# Patient Record
(demographics unavailable — no encounter records)

---

## 2025-05-19 NOTE — HISTORY OF PRESENT ILLNESS
[de-identified] : Age: 51 year F PMHx: HLD Hand Dominance: RHD Chief Complaint: Left hand and right knee pain s/p mechanical fall 04/01/25. Patient reports that she had lost her footing and fallen, landing onto her right knee and bracing with her left hand. Patient states that she was seen by another orthopedist in early April 2025 who had ordered radiographs of her left hand and wrist, which she had performed at  on 04/15/25 (benign). Patient was told that her knee pain is likely a bruise but states it has persisted ever since the initial fall. Patient would like radiographs of her right knee today. Patient reports intermittent shooting pains in the left hand that radiate up into the lateral aspect of her left elbow. Denies numbness/tingling at this time.  Trauma: 04/01/25 Outside Imaging/Treatment: radiographs of bilateral hands and wrists 04/15/25 OTC Medications: none OT/PT: none Bracing: wrist brace from outside orthopedist Pain worse with: exertion, weight bearing Pain better with: rest